# Patient Record
Sex: MALE | Race: WHITE | NOT HISPANIC OR LATINO | URBAN - METROPOLITAN AREA
[De-identification: names, ages, dates, MRNs, and addresses within clinical notes are randomized per-mention and may not be internally consistent; named-entity substitution may affect disease eponyms.]

---

## 2024-04-09 ENCOUNTER — EMERGENCY (EMERGENCY)
Facility: HOSPITAL | Age: 64
LOS: 1 days | Discharge: HOME | End: 2024-04-09
Attending: STUDENT IN AN ORGANIZED HEALTH CARE EDUCATION/TRAINING PROGRAM | Admitting: EMERGENCY MEDICINE
Payer: MEDICARE

## 2024-04-09 VITALS
HEART RATE: 70 BPM | HEIGHT: 74 IN | DIASTOLIC BLOOD PRESSURE: 86 MMHG | OXYGEN SATURATION: 99 % | TEMPERATURE: 98 F | RESPIRATION RATE: 18 BRPM | SYSTOLIC BLOOD PRESSURE: 157 MMHG | WEIGHT: 229.94 LBS

## 2024-04-09 LAB
ANION GAP SERPL CALC-SCNC: 7 MMOL/L — SIGNIFICANT CHANGE UP (ref 5–17)
BASOPHILS # BLD AUTO: 0.04 K/UL — SIGNIFICANT CHANGE UP (ref 0–0.2)
BASOPHILS NFR BLD AUTO: 0.7 % — SIGNIFICANT CHANGE UP (ref 0–2)
BUN SERPL-MCNC: 17 MG/DL — SIGNIFICANT CHANGE UP (ref 7–23)
CALCIUM SERPL-MCNC: 8.8 MG/DL — SIGNIFICANT CHANGE UP (ref 8.5–10.1)
CHLORIDE SERPL-SCNC: 103 MMOL/L — SIGNIFICANT CHANGE UP (ref 96–108)
CO2 SERPL-SCNC: 28 MMOL/L — SIGNIFICANT CHANGE UP (ref 22–31)
CREAT SERPL-MCNC: 1.2 MG/DL — SIGNIFICANT CHANGE UP (ref 0.5–1.3)
EGFR: 67 ML/MIN/1.73M2 — SIGNIFICANT CHANGE UP
EOSINOPHIL # BLD AUTO: 0.03 K/UL — SIGNIFICANT CHANGE UP (ref 0–0.5)
EOSINOPHIL NFR BLD AUTO: 0.5 % — SIGNIFICANT CHANGE UP (ref 0–6)
GLUCOSE SERPL-MCNC: 143 MG/DL — HIGH (ref 70–99)
HCT VFR BLD CALC: 36.6 % — LOW (ref 39–50)
HGB BLD-MCNC: 13.3 G/DL — SIGNIFICANT CHANGE UP (ref 13–17)
IMM GRANULOCYTES NFR BLD AUTO: 0.3 % — SIGNIFICANT CHANGE UP (ref 0–0.9)
LYMPHOCYTES # BLD AUTO: 0.94 K/UL — LOW (ref 1–3.3)
LYMPHOCYTES # BLD AUTO: 16.2 % — SIGNIFICANT CHANGE UP (ref 13–44)
MCHC RBC-ENTMCNC: 33.6 PG — SIGNIFICANT CHANGE UP (ref 27–34)
MCHC RBC-ENTMCNC: 36.3 GM/DL — HIGH (ref 32–36)
MCV RBC AUTO: 92.4 FL — SIGNIFICANT CHANGE UP (ref 80–100)
MONOCYTES # BLD AUTO: 0.43 K/UL — SIGNIFICANT CHANGE UP (ref 0–0.9)
MONOCYTES NFR BLD AUTO: 7.4 % — SIGNIFICANT CHANGE UP (ref 2–14)
NEUTROPHILS # BLD AUTO: 4.35 K/UL — SIGNIFICANT CHANGE UP (ref 1.8–7.4)
NEUTROPHILS NFR BLD AUTO: 74.9 % — SIGNIFICANT CHANGE UP (ref 43–77)
NRBC # BLD: 0 /100 WBCS — SIGNIFICANT CHANGE UP (ref 0–0)
PLATELET # BLD AUTO: 143 K/UL — LOW (ref 150–400)
POTASSIUM SERPL-MCNC: 4.2 MMOL/L — SIGNIFICANT CHANGE UP (ref 3.5–5.3)
POTASSIUM SERPL-SCNC: 4.2 MMOL/L — SIGNIFICANT CHANGE UP (ref 3.5–5.3)
RBC # BLD: 3.96 M/UL — LOW (ref 4.2–5.8)
RBC # FLD: 12.2 % — SIGNIFICANT CHANGE UP (ref 10.3–14.5)
SODIUM SERPL-SCNC: 138 MMOL/L — SIGNIFICANT CHANGE UP (ref 135–145)
WBC # BLD: 5.81 K/UL — SIGNIFICANT CHANGE UP (ref 3.8–10.5)
WBC # FLD AUTO: 5.81 K/UL — SIGNIFICANT CHANGE UP (ref 3.8–10.5)

## 2024-04-09 PROCEDURE — 99285 EMERGENCY DEPT VISIT HI MDM: CPT | Mod: 25

## 2024-04-09 NOTE — ED ADULT NURSE NOTE - NSFALLUNIVINTERV_ED_ALL_ED
Bed/Stretcher in lowest position, wheels locked, appropriate side rails in place/Call bell, personal items and telephone in reach/Instruct patient to call for assistance before getting out of bed/chair/stretcher/Non-slip footwear applied when patient is off stretcher/[place] to call system/Physically safe environment - no spills, clutter or unnecessary equipment/Purposeful proactive rounding/Room/bathroom lighting operational, light cord in reach

## 2024-04-09 NOTE — ED PROVIDER NOTE - PHYSICAL EXAMINATION
Constitutional: Awake, Alert, non-toxic. No acute distress. Well appearing, well nourished.   HEAD: Normocephalic, atraumatic.   EYES: EOM intact, conjunctiva and sclera are clear bilaterally.  ENT: External ears normal. No rhinorrhea, no tracheal deviation   NECK: Supple, non-tender  CARDIOVASCULAR: regular rate  RESPIRATORY: Normal respiratory effort; Speaking in full sentences. No accessory muscle use.   MSK:  no lower extremity edema, no deformities  ABDOMEN: suprapubic fullness, small amount of blood in liao leg bag  SKIN: Warm, dry  NEURO: A&O x3. Sensory and motor functions are grossly intact. Speech is normal.  PSYCH: Appearance and judgement seem appropriate for gender and age.

## 2024-04-09 NOTE — ED PROVIDER NOTE - CARE PROVIDER_API CALL
Guillaume Aguila  Urology  [address], [address]  [place] [address]  Phone: [phone]  Fax: [phone]  Follow Up Time:

## 2024-04-09 NOTE — ED PROVIDER NOTE - OBJECTIVE STATEMENT
Patient with past medical history of recent prostate fusion biopsy is presenting with concern for hematuria and decreased Salgado output.  States that he had a fusion biopsy today with Dr. Sneed, followed up with Dr. Aguila in the afternoon and had a catheter placed.  About 2 hours after the catheter was placed, states that he stopped passing urine.  Noted blood at that time.

## 2024-04-09 NOTE — ED ADULT TRIAGE NOTE - CHIEF COMPLAINT QUOTE
----- Message from Karolina Watkins NP sent at 7/9/2021  8:16 AM CDT -----  Normal UA no infection  Likely overactive bladder.   Limit caffeine, avoid smoking, avoid constipation, maintain good weight.   Consider Physical Therapy for incontinence  Final step yusef be Detrol XR 5 mg daily.     
Left message for patient to call back for below results  
Patient called back and was informed of below response from provider. Patient was in agreement and referral was placed for PT to be done at [facility].   
Blocked liao s/p fusion bx this AM

## 2024-04-09 NOTE — ED ADULT NURSE NOTE - OBJECTIVE STATEMENT
received pt   stable c/o  abd pain increasing s/p fusion biopsy this am and liao catheter blocked with bloody urine pt evaluated and blood work sent to lab 3 ways liao in place  by MD and CBI initiated

## 2024-04-09 NOTE — ED PROVIDER NOTE - PATIENT PORTAL LINK FT
You can access the FollowMyHealth Patient Portal offered by [facility] by registering at the following website: [email or web address]. By joining [facility]’s FollowMyHealth portal, you will also be able to view your health information using other applications (apps) compatible with our system.

## 2024-04-09 NOTE — ED PROVIDER NOTE - NSFOLLOWUPINSTRUCTIONS_ED_ALL_ED_FT
Indwelling Urinary Catheter Care, Adult    An indwelling urinary catheter is a thin, flexible, germ-free (sterile) tube that is placed into the bladder to help drain urine out of the body. The catheter is inserted into the part of the body that drains urine from the bladder (urethra). Urine drains from the catheter into a drainage bag outside of the body.    Taking good care of your catheter will keep it working properly and help to prevent problems from developing.    What are the risks?  •Bacteria may get into your bladder and cause a urinary tract infection.  •Urine flow can become blocked. This can happen if the catheter is not working correctly, or if you have sediment or a blood clot in your bladder or the catheter.  •Tissue near the catheter may become irritated and bleed.    How to wear your catheter and your drainage bag    Supplies needed   •Adhesive tape or a leg strap.  •Alcohol wipe or soap and water (if you use tape).  •A clean towel (if you use tape).  •Overnight drainage bag.  •Smaller drainage bag (leg bag).    Wearing your catheter and bag     Use adhesive tape or a leg strap to attach your catheter to your leg.  •Make sure the catheter is not pulled tight.  •If a leg strap gets wet, replace it with a dry one.  •If you use adhesive tape:  1.Use an alcohol wipe or soap and water to wash off any stickiness on your skin where you had tape before.  2.Use a clean towel to pat-dry the area.  3.Apply the new tape.    You should have received a large overnight drainage bag and a smaller leg bag that fits underneath clothing.   •You may wear the overnight bag at any time, but you should not wear the leg bag at night.  •Always wear the leg bag below your knee.  •Make sure the overnight drainage bag is always lower than the level of your bladder, but do not let it touch the floor. Before you go to sleep, hang the bag inside a wastebasket that is covered by a clean plastic bag.    How to care for your skin around the catheter    Supplies needed   •A clean washcloth.  •Water and mild soap.  •A clean towel.    Caring for your skin and catheter   •Every day, use a clean washcloth and soapy water to clean the skin around your catheter.  1.Wash your hands with soap and water.  2.Wet a washcloth in warm water and mild soap.  3.Clean the skin around your urethra.•If you are female:  •Use one hand to gently spread the folds of skin around your vagina (labia).  •With the washcloth in your other hand, wipe the inner side of your labia on each side. Do this in a front-to-back direction.    •If you are male:  •Use one hand to pull back any skin that covers the end of your penis (foreskin).  •With the washcloth in your other hand, wipe your penis in small circles. Start wiping at the tip of your penis, then move outward from the catheter.  •Move the foreskin back in place, if this applies.    4.With your free hand, hold the catheter close to where it enters your body. Keep holding the catheter during cleaning so it does not get pulled out.  5.Use your other hand to clean the catheter with the washcloth.  •Only wipe downward on the catheter, toward the bag.  •Do not wipe upward toward your body, because that may push bacteria into your urethra and cause infection.    6.Use a clean towel to pat-dry the catheter and the skin around it. Make sure to wipe off all soap.  7.Wash your hands with soap and water.    •Shower every day. Do not take baths.  • Do not use cream, ointment, or lotion on the area where the catheter enters your body, unless your health care provider tells you to do that.  • Do not use powders, sprays, or lotions on your genital area.    •Check your skin around the catheter every day for signs of infection. Check for:  •Redness, swelling, or pain.  •Fluid or blood.  •Warmth.  •Pus or a bad smell.    How to empty the drainage bag  Supplies needed   •Rubbing alcohol.  •Gauze pad or cotton ball.  •Adhesive tape or a leg strap.    Emptying the bag     Empty your drainage bag (your overnight drainage bag or your leg bag) when it is ?–½ full, or at least 2–3 times a day. Clean the drainage bag according to the 's instructions or as told by your health care provider.  1.Wash your hands with soap and water.  2.Detach the drainage bag from your leg.  3.Hold the drainage bag over the toilet or a clean container. Make sure the drainage bag is lower than your hips and bladder. This stops urine from going back into the tubing and into your bladder.  4.Open the pour spout at the bottom of the bag.  5.Empty the urine into the toilet or container. Do not let the pour spout touch any surface. This precaution is important to prevent bacteria from getting in the bag and causing infection.  6.Apply rubbing alcohol to a gauze pad or cotton ball.  7.Use the gauze pad or cotton ball to clean the pour spout.  8.Close the pour spout.  9.Attach the bag to your leg with adhesive tape or a leg strap.  10.Wash your hands with soap and water.    How to change the drainage bag  Supplies needed:   •Alcohol wipes.  •A clean drainage bag.  •Adhesive tape or a leg strap.    Changing the bag   Replace your drainage bag with a clean bag if it leaks, starts to smell bad, or looks dirty.  1.Wash your hands with soap and water.  2.Detach the dirty drainage bag from your leg.  3.Pinch the catheter with your fingers so that urine does not spill out.  4.Disconnect the catheter tube from the drainage tube at the connection valve. Do not let the tubes touch any surface.  5.Clean the end of the catheter tube with an alcohol wipe. Use a different alcohol wipe to clean the end of the drainage tube.  6.Connect the catheter tube to the drainage tube of the clean bag.  7.Attach the clean bag to your leg with adhesive tape or a leg strap. Avoid attaching the new bag too tightly.  8.Wash your hands with soap and water.    General instructions  Washing hands with soap and water.   • Never pull on your catheter or try to remove it. Pulling can damage your internal tissues.  •Always wash your hands before and after you handle your catheter or drainage bag. Use a mild, fragrance-free soap. If soap and water are not available, use hand .  •Always make sure there are no twists or bends (kinks) in the catheter tube.  •Always make sure there are no leaks in the catheter or drainage bag.  •Drink enough fluid to keep your urine pale yellow.  • Do not take baths, swim, or use a hot tub.  •If you are female, wipe from front to back after having a bowel movement.    Contact a health care provider if:  •Your urine is cloudy.  •Your urine smells unusually bad.  •Your catheter gets clogged.  •Your catheter starts to leak.  •Your bladder feels full.    Get help right away if:  •You have redness, swelling, or pain where the catheter enters your body.  •You have fluid, blood, pus, or a bad smell coming from the area where the catheter enters your body.  •The area where the catheter enters your body feels warm to the touch.  •You have a fever.  •You have pain in your abdomen, legs, lower back, or bladder.  •You see blood in the catheter.  •Your urine is pink or red.  •You have nausea, vomiting, or chills.  •Your urine is not draining into the bag.  •Your catheter gets pulled out.    Summary  •An indwelling urinary catheter is a thin, flexible, germ-free (sterile) tube that is placed into the bladder to help drain urine out of the body.  •The catheter is inserted into the part of the body that drains urine from the bladder (urethra).  •Take good care of your catheter to keep it working properly and help prevent problems from developing.  •Always wash your hands before and after you handle your catheter or drainage bag.  • Never pull on your catheter or try to remove it.    This information is not intended to replace advice given to you by your health care provider. Make sure you discuss any questions you have with your health care provider.

## 2024-04-09 NOTE — ED PROVIDER NOTE - CLINICAL SUMMARY MEDICAL DECISION MAKING FREE TEXT BOX
Patient with acute urinary retention likely secondary to blood clot from his catheter.  Three-way Salgado catheter placed, clots withdrawn and now draining fruit punch colored urine.  Will obtain lab work, consult urology.  Will continue three-way irrigation at this time.

## 2024-04-09 NOTE — ED PROVIDER NOTE - PROGRESS NOTE DETAILS
Case discussed with Dr. Aguila.  Recommend holding patient in ER overnight for morning evaluation.  Will continue bladder irrigation until then.  Urine now draining light pink-colored urine.  Sagar Thomas MD.

## 2024-04-10 VITALS
RESPIRATION RATE: 17 BRPM | DIASTOLIC BLOOD PRESSURE: 75 MMHG | TEMPERATURE: 97 F | OXYGEN SATURATION: 96 % | HEART RATE: 61 BPM | SYSTOLIC BLOOD PRESSURE: 143 MMHG

## 2024-04-10 DIAGNOSIS — R31.0 GROSS HEMATURIA: ICD-10-CM

## 2024-04-10 DIAGNOSIS — R33.9 RETENTION OF URINE, UNSPECIFIED: ICD-10-CM

## 2024-04-10 PROCEDURE — 99284 EMERGENCY DEPT VISIT MOD MDM: CPT | Mod: 25

## 2024-04-10 PROCEDURE — 85025 COMPLETE CBC W/AUTO DIFF WBC: CPT

## 2024-04-10 PROCEDURE — 36415 COLL VENOUS BLD VENIPUNCTURE: CPT

## 2024-04-10 PROCEDURE — 80048 BASIC METABOLIC PNL TOTAL CA: CPT

## 2024-04-10 PROCEDURE — 51702 INSERT TEMP BLADDER CATH: CPT

## 2024-04-10 RX ORDER — LANOLIN ALCOHOL/MO/W.PET/CERES
5 CREAM (GRAM) TOPICAL AT BEDTIME
Refills: 0 | Status: DISCONTINUED | OUTPATIENT
Start: 2024-04-10 | End: 2024-04-13

## 2024-04-10 RX ADMIN — Medication 5 MILLIGRAM(S): at 01:14

## 2024-04-10 NOTE — CONSULT NOTE ADULT - SUBJECTIVE AND OBJECTIVE BOX
CHIEF COMPLAINT: urinary retention    HISTORY OF PRESENT ILLNESS: 63 y/o s/p prostate bx 4/9 developed urinary retention post op. He was seen in our office, a liao was placed and irrigated clear. He developed clot retention and was broght to the ED where cbi was begun. He has been irrigated for small clots through the night    PAST MEDICAL & SURGICAL HISTORY:      REVIEW OF SYSTEMS:    CONSTITUTIONAL: No weakness, fevers or chills  EYES/ENT: No visual changes;  No vertigo or throat pain   NECK: No pain or stiffness  RESPIRATORY: No cough, wheezing, hemoptysis; No shortness of breath  CARDIOVASCULAR: No chest pain or palpitations  GASTROINTESTINAL: No abdominal or epigastric pain. No nausea, vomiting, or hematemesis; No diarrhea or constipation. No melena or hematochezia.  GENITOURINARY: see hpi  NEUROLOGICAL: No numbness or weakness  SKIN: No itching, burning, rashes, or lesions   All other review of systems is negative unless indicated above.    MEDICATIONS  (STANDING):  melatonin 5 milliGRAM(s) Oral at bedtime    MEDICATIONS  (PRN):      Allergies    No Known Allergies    Intolerances        SOCIAL HISTORY:    FAMILY HISTORY:      Vital Signs Last 24 Hrs  T(C): 36.7 (10 Apr 2024 07:18), Max: 37 (10 Apr 2024 04:39)  T(F): 98 (10 Apr 2024 07:18), Max: 98.6 (10 Apr 2024 04:39)  HR: 61 (10 Apr 2024 07:18) (61 - 73)  BP: 137/64 (10 Apr 2024 07:18) (137/64 - 161/73)  BP(mean): --  RR: 18 (10 Apr 2024 07:18) (18 - 18)  SpO2: 96% (10 Apr 2024 07:18) (96% - 99%)    Parameters below as of 10 Apr 2024 07:18  Patient On (Oxygen Delivery Method): room air        PHYSICAL EXAM:    Constitutional: NAD, well-developed  HEENT: NUPUR, EOMI, Normal Hearing, MMM  Neck: No LAD, No JVD  Back: Normal spine flexure, No CVA tenderness  Respiratory: CTAB   Cardiovascular: S1 and S2, RRR, no M/G/R  Abd: BS+, soft, NT/ND, No CVAT  : Normal phallus,open meatus,bilateral descended testes, no masses, in dwelling liao draining clear urine on cbi  Extremities: No peripheral edema  Vascular: 2+ peripheral pulses  Neurological: A/O x 3, no focal deficits  Psychiatric: Normal mood, normal affect  Musculoskeletal: 5/5 strength b/l upper and lower extremities  Skin: No rashes    LABS:                        13.3   5.81  )-----------( 143      ( 09 Apr 2024 21:40 )             36.6     04-09    138  |  103  |  17  ----------------------------<  143<H>  4.2   |  28  |  1.20    Ca    8.8      09 Apr 2024 21:40        Urinalysis Basic - ( 09 Apr 2024 21:40 )    Color: x / Appearance: x / SG: x / pH: x  Gluc: 143 mg/dL / Ketone: x  / Bili: x / Urobili: x   Blood: x / Protein: x / Nitrite: x   Leuk Esterase: x / RBC: x / WBC x   Sq Epi: x / Non Sq Epi: x / Bacteria: x      Urine Culture:     RADIOLOGY & ADDITIONAL STUDIES:

## 2024-04-10 NOTE — CONSULT NOTE ADULT - PROBLEM SELECTOR RECOMMENDATION 9
Post op hematuria and clot retention. Irrigated by me now, no clots. CBI stopped. If remains clear, he will be sent home with liao for tov tomorrow, o/w he will need admission for cbi

## 2024-04-10 NOTE — ED ADULT NURSE REASSESSMENT NOTE - NS ED NURSE REASSESS COMMENT FT1
pt stable with continuos bladder irrigation initiated by Md Thomas s/p insertion of 3 ways liao 22fr coude catheter  pt with pink urine noted and Md garcia made aware  pt awaiting for Dr curry in am
report taken by mercedes Winter. Patient is A&O4. denies SOB, CP or discomfort at this time. CBI in progress. Color is clear.

## 2025-05-05 NOTE — ED ADULT NURSE NOTE - NS ED NOTE ABUSE RESPONSE YN
Notified by Irene Anderson in Lab Services that Magnesium 0.78. Secure Chat sent to [place] Rehman NP. Pt has appointment at [facility] [place]   Unable to assess due to medical condition